# Patient Record
Sex: MALE | Race: WHITE | URBAN - METROPOLITAN AREA
[De-identification: names, ages, dates, MRNs, and addresses within clinical notes are randomized per-mention and may not be internally consistent; named-entity substitution may affect disease eponyms.]

---

## 2017-11-14 ENCOUNTER — HISTORICAL (OUTPATIENT)
Dept: ADMINISTRATIVE | Facility: HOSPITAL | Age: 47
End: 2017-11-14

## 2017-12-04 ENCOUNTER — HISTORICAL (OUTPATIENT)
Dept: ADMINISTRATIVE | Facility: HOSPITAL | Age: 47
End: 2017-12-04

## 2018-01-03 ENCOUNTER — HISTORICAL (OUTPATIENT)
Dept: LAB | Facility: HOSPITAL | Age: 48
End: 2018-01-03

## 2018-01-03 ENCOUNTER — HISTORICAL (OUTPATIENT)
Dept: ADMINISTRATIVE | Facility: HOSPITAL | Age: 48
End: 2018-01-03

## 2018-01-10 ENCOUNTER — HOSPITAL ENCOUNTER (OUTPATIENT)
Dept: MEDSURG UNIT | Facility: HOSPITAL | Age: 48
End: 2018-01-11
Attending: ORTHOPAEDIC SURGERY | Admitting: ORTHOPAEDIC SURGERY

## 2018-01-10 LAB
GRAM STN SPEC: NORMAL
GRAM STN SPEC: NORMAL

## 2018-01-13 LAB
FINAL CULTURE: NORMAL
FINAL CULTURE: NORMAL

## 2018-01-15 LAB
FINAL CULTURE: NORMAL
FINAL CULTURE: NORMAL
GRAM STN SPEC: NORMAL
GRAM STN SPEC: NORMAL

## 2018-02-19 LAB
FINAL CULTURE: NORMAL
FINAL CULTURE: NORMAL

## 2018-02-26 ENCOUNTER — HISTORICAL (OUTPATIENT)
Dept: ADMINISTRATIVE | Facility: HOSPITAL | Age: 48
End: 2018-02-26

## 2018-04-09 ENCOUNTER — HISTORICAL (OUTPATIENT)
Dept: ADMINISTRATIVE | Facility: HOSPITAL | Age: 48
End: 2018-04-09

## 2018-06-14 ENCOUNTER — HISTORICAL (OUTPATIENT)
Dept: ADMINISTRATIVE | Facility: HOSPITAL | Age: 48
End: 2018-06-14

## 2018-08-15 ENCOUNTER — HISTORICAL (OUTPATIENT)
Dept: ADMINISTRATIVE | Facility: HOSPITAL | Age: 48
End: 2018-08-15

## 2018-10-16 ENCOUNTER — HISTORICAL (OUTPATIENT)
Dept: ADMINISTRATIVE | Facility: HOSPITAL | Age: 48
End: 2018-10-16

## 2022-04-07 ENCOUNTER — HISTORICAL (OUTPATIENT)
Dept: ADMINISTRATIVE | Facility: HOSPITAL | Age: 52
End: 2022-04-07

## 2022-04-24 VITALS
SYSTOLIC BLOOD PRESSURE: 108 MMHG | HEIGHT: 72 IN | DIASTOLIC BLOOD PRESSURE: 67 MMHG | OXYGEN SATURATION: 98 % | BODY MASS INDEX: 20.34 KG/M2 | WEIGHT: 150.13 LBS

## 2022-04-30 NOTE — DISCHARGE SUMMARY
Patient:   Benson Victoria            MRN: [id number]            FIN: [id number]               Age:   47 years     Sex:  Male     :  1970   Associated Diagnoses:   Displaced comminuted fracture of shaft of right tibia, subsequent encounter for open fracture type IIIA, IIIB, or IIIC with nonunion; Equinus contracture of right ankle   Author:   Jr Lee NP      Discharge Information      Discharge Summary Information   Admitted  1/10/2018   Discharged  2018   Admitting physician     Sean Wing MD     Discharge diagnosis     Displaced comminuted fracture of shaft of right tibia, subsequent encounter for open fracture type IIIA, IIIB, or IIIC with nonunion ([id number] S82.251C).     Equinus contracture of right ankle ([id number]-WV M24.571).        Hospital Course   Surgery: 1/10/2018- 1. Repair nonunion R tibia with graft   2. removal abx spacer   3. Achilles tendon lengthening    HPI: Admitted as outpatient for planned nonunion bone grafting and heel cord lengthening.    Hospital course: Day 1-surgery, then admitted to ortho unit for post-op care. Day 2-stable, ready for discharge.    Discharge Disposition: Home    Dischage Instructions: NWB RLE    Discharge Wound Care: Keep dressing/splint c/d/i.    Discharge Diet: Resume previous diet.    Discharge Medications: See med list.    Follow Up: 2 weeks with Ortho
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
